# Patient Record
Sex: MALE | Race: WHITE | NOT HISPANIC OR LATINO
[De-identification: names, ages, dates, MRNs, and addresses within clinical notes are randomized per-mention and may not be internally consistent; named-entity substitution may affect disease eponyms.]

---

## 2022-12-27 ENCOUNTER — APPOINTMENT (OUTPATIENT)
Dept: UROLOGY | Facility: CLINIC | Age: 37
End: 2022-12-27

## 2022-12-27 VITALS
SYSTOLIC BLOOD PRESSURE: 132 MMHG | WEIGHT: 160 LBS | BODY MASS INDEX: 22.9 KG/M2 | OXYGEN SATURATION: 99 % | HEART RATE: 88 BPM | DIASTOLIC BLOOD PRESSURE: 78 MMHG | HEIGHT: 70 IN

## 2022-12-27 DIAGNOSIS — Z78.9 OTHER SPECIFIED HEALTH STATUS: ICD-10-CM

## 2022-12-27 DIAGNOSIS — N40.1 BENIGN PROSTATIC HYPERPLASIA WITH LOWER URINARY TRACT SYMPMS: ICD-10-CM

## 2022-12-27 DIAGNOSIS — N13.8 BENIGN PROSTATIC HYPERPLASIA WITH LOWER URINARY TRACT SYMPMS: ICD-10-CM

## 2022-12-27 PROBLEM — Z00.00 ENCOUNTER FOR PREVENTIVE HEALTH EXAMINATION: Status: ACTIVE | Noted: 2022-12-27

## 2022-12-27 PROCEDURE — 99204 OFFICE O/P NEW MOD 45 MIN: CPT

## 2022-12-27 RX ORDER — MELOXICAM 15 MG/1
15 TABLET ORAL
Qty: 30 | Refills: 0 | Status: ACTIVE | COMMUNITY
Start: 2022-07-21

## 2022-12-27 RX ORDER — ALFUZOSIN HYDROCHLORIDE 10 MG/1
10 TABLET, EXTENDED RELEASE ORAL DAILY
Qty: 30 | Refills: 1 | Status: ACTIVE | COMMUNITY
Start: 2022-12-27 | End: 1900-01-01

## 2022-12-27 RX ORDER — SULFAMETHOXAZOLE AND TRIMETHOPRIM 800; 160 MG/1; MG/1
800-160 TABLET ORAL
Qty: 28 | Refills: 0 | Status: ACTIVE | COMMUNITY
Start: 2022-12-13

## 2022-12-27 RX ORDER — CIPROFLOXACIN HYDROCHLORIDE 500 MG/1
500 TABLET, FILM COATED ORAL
Qty: 20 | Refills: 0 | Status: ACTIVE | COMMUNITY
Start: 2022-12-12

## 2023-02-10 ENCOUNTER — APPOINTMENT (OUTPATIENT)
Dept: UROLOGY | Facility: CLINIC | Age: 38
End: 2023-02-10
Payer: COMMERCIAL

## 2023-02-10 VITALS
HEIGHT: 70 IN | HEART RATE: 92 BPM | OXYGEN SATURATION: 98 % | WEIGHT: 155 LBS | BODY MASS INDEX: 22.19 KG/M2 | SYSTOLIC BLOOD PRESSURE: 125 MMHG | TEMPERATURE: 98.2 F | DIASTOLIC BLOOD PRESSURE: 73 MMHG

## 2023-02-10 PROCEDURE — 52000 CYSTOURETHROSCOPY: CPT

## 2023-02-10 NOTE — ASSESSMENT
[FreeTextEntry1] : pvr around 400 ml \par suspect chronic - nl cr\par reviewed cath - defers - completing his training at [employer] next few weeks \par when he returns will do ct and cysto \par start alfuzosin - side effects reviewed

## 2023-02-10 NOTE — REVIEW OF SYSTEMS
[both] : pain during and after intercourse [denies] : denies pain with orgasm [base] : pain in base of penis [Blood in urine that you can see] : blood visible in urine [Wake up at night to urinate  How many times?  ___] : wakes up to urinate [unfilled] times during the night [Strong urge to urinate] : strong urge to urinate [Strain or push to urinate] : strain or push to urinate [Slow urine stream] : slow urine stream [Bladder fullness after urinating] : bladder fullness after urinating [Negative] : Heme/Lymph [FreeTextEntry2] : frequent urination

## 2023-02-10 NOTE — HISTORY OF PRESENT ILLNESS
[FreeTextEntry1] : cc blood in urine \par 38 yo male c/o blood in urine \par had several episode of terminal gross hematuria a few weeks ago \par no dysuria \par reports obstructive urinary symptoms for last year . slow flow dribbling splitting \par had prior cath many years ago when he went into retention after tonsil surgery - reports it was traumatic requiring urology consult \par sexually active wife only no h/o std \par no constipation \par went to [facility] labs /cr\par taking course of bactrtim feeling a little better\par no smoking \par no fam h/ gu ca \par fbi agent

## 2023-02-16 ENCOUNTER — APPOINTMENT (OUTPATIENT)
Dept: UROLOGY | Facility: CLINIC | Age: 38
End: 2023-02-16
Payer: COMMERCIAL

## 2023-02-16 VITALS
RESPIRATION RATE: 18 BRPM | HEART RATE: 78 BPM | WEIGHT: 152.1 LBS | SYSTOLIC BLOOD PRESSURE: 127 MMHG | BODY MASS INDEX: 21.82 KG/M2 | OXYGEN SATURATION: 98 % | TEMPERATURE: 97.8 F | DIASTOLIC BLOOD PRESSURE: 84 MMHG

## 2023-02-16 DIAGNOSIS — R31.0 GROSS HEMATURIA: ICD-10-CM

## 2023-02-16 DIAGNOSIS — Z82.0 FAMILY HISTORY OF EPILEPSY AND OTHER DISEASES OF THE NERVOUS SYSTEM: ICD-10-CM

## 2023-02-16 PROCEDURE — 99214 OFFICE O/P EST MOD 30 MIN: CPT

## 2023-02-16 NOTE — PHYSICAL EXAM
[General Appearance - Well Developed] : well developed [General Appearance - Well Nourished] : well nourished [Normal Appearance] : normal appearance [Well Groomed] : well groomed [General Appearance - In No Acute Distress] : no acute distress [Urethral Meatus] : meatus normal [Urinary Bladder Findings] : the bladder was normal on palpation [Scrotum] : the scrotum was normal [Testes Tenderness] : no tenderness of the testes [Testes Mass (___cm)] : there were no testicular masses

## 2023-02-16 NOTE — ASSESSMENT
[FreeTextEntry1] : Patient is a 36 yo M who presents for gross hematuria, urethral stricture, and LUTS.\par \par -hematuria likely related to urethral stricture seen on cysto with Dr Coley, will check Ucyto for complete w/u.\par -PVR today is low, however pt reports restricted fluid today, and voided 5x already\par -d/w pt that will perform RUG to further evaluate stricture.  I had a discussion with the pt about his condition and possible treatment options for his urethral stricture.  We discussed endoscopic management such as internal urethrotomy and urethroplasty.  Discussed the typical reported success rates with each treatment option.

## 2023-02-16 NOTE — HISTORY OF PRESENT ILLNESS
[FreeTextEntry1] : Patient is 37 year old M who has no significant medical history presents for urethral stricture evaluation.\par Patient reports about ~3 year history of weak/slow stream, urinary frequency, urgency, straining, takes longer time to complete, urine split, incomplete empty sensation, symptom gradually worsening for 8-9 months. He reports urinary frequency every 30 minutes during the daytime. Patient also noted terminal gross hematuria in Dec, 2022. \par \par No incontinence, no history of perineal trauma. Patient reports he had difficulty to urinate after tonsillectomy in 2009, need urological consultation for catheter placement  to drain urine.  Reports multiple traumatic attempts prior to urologist who performed catheter placement - ?dilation.\par \par He has reduced his fluid intake to 1 glass/bottle per day.  \par He works for [employer] and states he is often in a car for long periods of time and therefore cannot go to the bathroom.\par Patient denied history of STIs. \par \par Patient reports never smoking, drinks socially, no substance use. \par \par \par

## 2023-02-17 ENCOUNTER — APPOINTMENT (OUTPATIENT)
Age: 38
End: 2023-02-17

## 2023-02-17 LAB — URINE CYTOLOGY: NORMAL

## 2023-02-24 ENCOUNTER — OUTPATIENT (OUTPATIENT)
Dept: OUTPATIENT SERVICES | Facility: HOSPITAL | Age: 38
LOS: 1 days | End: 2023-02-24
Payer: COMMERCIAL

## 2023-02-24 ENCOUNTER — APPOINTMENT (OUTPATIENT)
Dept: UROLOGY | Facility: CLINIC | Age: 38
End: 2023-02-24
Payer: COMMERCIAL

## 2023-02-24 VITALS
TEMPERATURE: 98 F | WEIGHT: 155 LBS | OXYGEN SATURATION: 100 % | HEIGHT: 70 IN | DIASTOLIC BLOOD PRESSURE: 73 MMHG | BODY MASS INDEX: 22.19 KG/M2 | SYSTOLIC BLOOD PRESSURE: 130 MMHG | HEART RATE: 70 BPM

## 2023-02-24 DIAGNOSIS — R35.0 FREQUENCY OF MICTURITION: ICD-10-CM

## 2023-02-24 PROCEDURE — 51610 INJECTION FOR BLADDER X-RAY: CPT

## 2023-02-24 PROCEDURE — 74450 X-RAY URETHRA/BLADDER: CPT

## 2023-02-24 PROCEDURE — 74450 X-RAY URETHRA/BLADDER: CPT | Mod: 26

## 2023-02-27 DIAGNOSIS — N35.919 UNSPECIFIED URETHRAL STRICTURE, MALE, UNSPECIFIED SITE: ICD-10-CM

## 2023-04-05 ENCOUNTER — OUTPATIENT (OUTPATIENT)
Dept: OUTPATIENT SERVICES | Facility: HOSPITAL | Age: 38
LOS: 1 days | End: 2023-04-05
Payer: COMMERCIAL

## 2023-04-05 VITALS
WEIGHT: 153 LBS | TEMPERATURE: 98 F | OXYGEN SATURATION: 97 % | SYSTOLIC BLOOD PRESSURE: 108 MMHG | RESPIRATION RATE: 18 BRPM | DIASTOLIC BLOOD PRESSURE: 79 MMHG | HEIGHT: 71 IN | HEART RATE: 79 BPM

## 2023-04-05 DIAGNOSIS — N35.919 UNSPECIFIED URETHRAL STRICTURE, MALE, UNSPECIFIED SITE: ICD-10-CM

## 2023-04-05 DIAGNOSIS — Z90.89 ACQUIRED ABSENCE OF OTHER ORGANS: Chronic | ICD-10-CM

## 2023-04-05 PROCEDURE — 87086 URINE CULTURE/COLONY COUNT: CPT

## 2023-04-05 PROCEDURE — G0463: CPT

## 2023-04-05 PROCEDURE — 36415 COLL VENOUS BLD VENIPUNCTURE: CPT

## 2023-04-05 PROCEDURE — 85027 COMPLETE CBC AUTOMATED: CPT

## 2023-04-05 RX ORDER — SODIUM CHLORIDE 9 MG/ML
1000 INJECTION, SOLUTION INTRAVENOUS
Refills: 0 | Status: DISCONTINUED | OUTPATIENT
Start: 2023-04-26 | End: 2023-05-10

## 2023-04-25 ENCOUNTER — TRANSCRIPTION ENCOUNTER (OUTPATIENT)
Age: 38
End: 2023-04-25

## 2023-04-25 PROBLEM — Z78.9 OTHER SPECIFIED HEALTH STATUS: Chronic | Status: ACTIVE | Noted: 2023-04-05

## 2023-04-26 ENCOUNTER — APPOINTMENT (OUTPATIENT)
Dept: UROLOGY | Facility: HOSPITAL | Age: 38
End: 2023-04-26

## 2023-04-26 ENCOUNTER — OUTPATIENT (OUTPATIENT)
Dept: OUTPATIENT SERVICES | Facility: HOSPITAL | Age: 38
LOS: 1 days | End: 2023-04-26
Payer: COMMERCIAL

## 2023-04-26 ENCOUNTER — TRANSCRIPTION ENCOUNTER (OUTPATIENT)
Age: 38
End: 2023-04-26

## 2023-04-26 ENCOUNTER — RESULT REVIEW (OUTPATIENT)
Age: 38
End: 2023-04-26

## 2023-04-26 VITALS
WEIGHT: 153 LBS | OXYGEN SATURATION: 97 % | HEART RATE: 64 BPM | SYSTOLIC BLOOD PRESSURE: 118 MMHG | DIASTOLIC BLOOD PRESSURE: 67 MMHG | TEMPERATURE: 98 F | HEIGHT: 71 IN | RESPIRATION RATE: 16 BRPM

## 2023-04-26 VITALS
DIASTOLIC BLOOD PRESSURE: 42 MMHG | RESPIRATION RATE: 16 BRPM | OXYGEN SATURATION: 98 % | HEART RATE: 60 BPM | SYSTOLIC BLOOD PRESSURE: 113 MMHG

## 2023-04-26 DIAGNOSIS — Z90.89 ACQUIRED ABSENCE OF OTHER ORGANS: Chronic | ICD-10-CM

## 2023-04-26 DIAGNOSIS — N35.919 UNSPECIFIED URETHRAL STRICTURE, MALE, UNSPECIFIED SITE: ICD-10-CM

## 2023-04-26 PROCEDURE — C1769: CPT

## 2023-04-26 PROCEDURE — 88305 TISSUE EXAM BY PATHOLOGIST: CPT | Mod: 26

## 2023-04-26 PROCEDURE — 53410 RECONSTRUCTION OF URETHRA: CPT

## 2023-04-26 PROCEDURE — 88305 TISSUE EXAM BY PATHOLOGIST: CPT

## 2023-04-26 PROCEDURE — C1758: CPT

## 2023-04-26 PROCEDURE — C9399: CPT

## 2023-04-26 DEVICE — URETERAL CATH OPEN END 5FR 70CM: Type: IMPLANTABLE DEVICE | Status: FUNCTIONAL

## 2023-04-26 DEVICE — GUIDEWIRE SENSOR DUAL-FLEX NITINOL STRAIGHT .038" X 150CM: Type: IMPLANTABLE DEVICE | Status: FUNCTIONAL

## 2023-04-26 RX ORDER — ACETAMINOPHEN WITH CODEINE 300MG-30MG
1 TABLET ORAL
Qty: 15 | Refills: 0
Start: 2023-04-26 | End: 2023-04-30

## 2023-04-26 RX ORDER — CEPHALEXIN 500 MG
1 CAPSULE ORAL
Qty: 15 | Refills: 0
Start: 2023-04-26 | End: 2023-04-30

## 2023-04-26 RX ORDER — CEPHALEXIN 500 MG
1 CAPSULE ORAL
Qty: 9 | Refills: 0
Start: 2023-04-26 | End: 2023-04-28

## 2023-04-26 RX ORDER — CEFAZOLIN SODIUM 1 G
2000 VIAL (EA) INJECTION ONCE
Refills: 0 | Status: COMPLETED | OUTPATIENT
Start: 2023-04-26 | End: 2023-04-26

## 2023-04-26 RX ORDER — IBUPROFEN 200 MG
1 TABLET ORAL
Qty: 20 | Refills: 0
Start: 2023-04-26 | End: 2023-04-30

## 2023-04-26 RX ORDER — ONDANSETRON 8 MG/1
4 TABLET, FILM COATED ORAL ONCE
Refills: 0 | Status: DISCONTINUED | OUTPATIENT
Start: 2023-04-26 | End: 2023-05-10

## 2023-04-26 RX ORDER — LIDOCAINE HCL 20 MG/ML
0.2 VIAL (ML) INJECTION ONCE
Refills: 0 | Status: COMPLETED | OUTPATIENT
Start: 2023-04-26 | End: 2023-04-26

## 2023-04-26 RX ORDER — FENTANYL CITRATE 50 UG/ML
25 INJECTION INTRAVENOUS
Refills: 0 | Status: DISCONTINUED | OUTPATIENT
Start: 2023-04-26 | End: 2023-04-26

## 2023-04-26 RX ADMIN — SODIUM CHLORIDE 100 MILLILITER(S): 9 INJECTION, SOLUTION INTRAVENOUS at 06:19

## 2023-04-26 NOTE — ASU DISCHARGE PLAN (ADULT/PEDIATRIC) - NURSING INSTRUCTIONS
You can take Tylenol (Acetaminophen) every 6 hours and Motrin (Ibuprofen) every 6 hours, as needed. You may alternate these medications such that you take one or the other every 3 hours for around the clock pain coverage. Do not exceed 4000mg of Tylenol (Acetaminophen) daily. You received Tylenol in the OR at 7:30AM. The next time you can take Tylenol is at 1:30PM. You can take Motrin/Ibuprofen at anytime today, if needed.

## 2023-04-26 NOTE — ASU DISCHARGE PLAN (ADULT/PEDIATRIC) - ASU DC SPECIAL INSTRUCTIONSFT
URETHRAL STRICTURE – URETHROPLASTY    Description of Surgery  Urethroplasty is a surgery performed to correct a urethral stricture. A urethral stricture refers to a blockage or narrowing of the urethra, the urine “tube” or part of the urinary tract that releases urine from the bladder and semen during ejaculation.  During surgery, an incision is typically made in the perineum just below the scrotum and the blocked area is removed and repaired.  Sometimes an incision will be made in the penis if the stricture is in the penile urethra.  Following repair a urinary catheter is left in place for assist in healing.    Postoperative Care  • After surgery, you may be discharged home the same day or stay in the hospital overnight.  There will be a catheter left in place which will be connected to a drainage bag (either a leg bag or a larger overnight bag).  You will be taught how to change the bag and empty it when it is full of urine.  The catheter will remain in place for roughly 2-3 weeks and will be removed at your follow up.  You may notice some blood or red/pink tinge to the urine with the catheter in place, this is normal during healing.  • Immediately following surgery, the perineal incision is closed with absorbable sutures underneath the skin followed by biological, waterproof glue on the skin.  The sutures will slowly dissolve over time and do not need to be removed.   The bluish colored glue will also slowly peel away on its own over a period of a few weeks.  In some instances, additional dressings may be placed such as gauze and a clear plastic sheet.  If a gauze dressing is placed, this may be removed after 3 days.  • You may experience mild perineal/scrotal itching.  This is normal and is the result of tissue healing.  You can also expect bruising and swelling of the perineum and scrotum following surgery, this can last for days to weeks.  Ice packs can be applied for 24 hours after surgery to reduce pain and swelling if necessary.  The amount of swelling is variable.  Scrotal support can also help reduce swelling and is best applied continuously during the initial few days after surgery.  • Pain in this area will vary.  You will be given pain medication to go home with.  Generally, most patients do not require strong pain medication for more than a couple of weeks.  Oftentimes, patients are able to transition themselves to over-the-counter pain medication alone, such as Extra-strength Tylenol or Ibuprofen at that time.     Diet  • There are no specific dietary restrictions unless buccal mucosa was harvested.  You may resume your usual diet immediately after surgery.  You should avoid alcohol while taking pain medications.  Do drink plenty of fluids to keep yourself well hydrated and your urine clear.    Bathing  • Avoid bathing, showering, or soaking for two days after surgery.  After 2 days, it is ok to shower but take care to avoid scrubbing or rubbing the incision.  It is best to allow soapy water to run over the surgical incision and to pat it dry.  • If you must bathe prior to the second day, it is best to sponge bathe and avoid wetting the incision.  Soaking in a tub should be avoided for one week.    Discharge Medications  • You will be discharged home with a prescription for pain medication and an antibiotic for roughly one week.  • In addition, prior to/around your catheter removal, you may be given further antibiotics.    Activities after surgery  • You may resume normal activities such as walking, lifting (no more than 10 pounds) and walking up stairs.  • You should refrain from high impact exercise (running, jumping, aerobics) and heavy lifting for 3-4 weeks following surgery, depending on your doctor’s instructions.  • Do not drive a car while you are taking narcotic pain medication. Before you drive, you need to know if your abdomen and leg muscles can react well. Have someone with you until you feel you have healed and you can drive safely.  • In general use your common sense and do what you feel up to doing. If you feel tired, take it easy. If you feel more energetic resume normal activities.    When to call your doctor  • If you experience a temperature above 101°F or shaking chills.  • Difficulty urinating or feeling like you cannot adequately empty your bladder once your catheter has been removed.  Some mild irritation or urinary changes is normal during the healing process, however significant pain with urination or inability to urine is unusual.  • Swelling, redness or discharge of the incision sites.  • Pain that is not controlled by pain medications.  • Persistent nausea, vomiting or diarrhea.    Postoperative appointment  • In general, your first post-operative appointment will take place approximately 2-3 weeks after surgery.  Your catheter may or may not be removed at this time.

## 2023-04-26 NOTE — ASU PATIENT PROFILE, ADULT - NS TRANSFER PATIENT BELONGINGS
wallet, $100, credit card, insurance card, photo id/Cell Phone/PDA (specify)/Jewelry/Money (specify)/Clothing

## 2023-04-26 NOTE — ASU PATIENT PROFILE, ADULT - FALL HARM RISK - UNIVERSAL INTERVENTIONS
Bed in lowest position, wheels locked, appropriate side rails in place/Call bell, personal items and telephone in reach/Instruct patient to call for assistance before getting out of bed or chair/Non-slip footwear when patient is out of bed/[place] to call system/Physically safe environment - no spills, clutter or unnecessary equipment/Purposeful Proactive Rounding/Room/bathroom lighting operational, light cord in reach

## 2023-04-26 NOTE — BRIEF OPERATIVE NOTE - OPERATION/FINDINGS
Procedure: urethroplasty  Preop dx: bulbar urethral stricture   Postop dx: bulbar urethral stricture

## 2023-04-26 NOTE — ASU DISCHARGE PLAN (ADULT/PEDIATRIC) - CARE PROVIDER_API CALL
Fan Humphrey)  Urology  [address] [place] [address], [place] [address]  Phone: [phone]  Fax: [phone]  Follow Up Time: 2 weeks

## 2023-04-26 NOTE — ASU DISCHARGE PLAN (ADULT/PEDIATRIC) - A. DRIVE A CAR, OPERATE POWER TOOLS OR MACHINERY
----- Message from Domingo Crisostomo M.D. sent at 8/5/2019  2:07 PM PDT -----  Please notify patient that her colon test (cologuard) is negative for cancer.  We recommend having this test done every 3 years for cancer screening.    Domingo Crisostomo M.D.     Statement Selected

## 2023-05-02 ENCOUNTER — APPOINTMENT (OUTPATIENT)
Age: 38
End: 2023-05-02

## 2023-05-02 LAB — SURGICAL PATHOLOGY STUDY: SIGNIFICANT CHANGE UP

## 2023-05-12 ENCOUNTER — OUTPATIENT (OUTPATIENT)
Dept: OUTPATIENT SERVICES | Facility: HOSPITAL | Age: 38
LOS: 1 days | End: 2023-05-12
Payer: COMMERCIAL

## 2023-05-12 ENCOUNTER — APPOINTMENT (OUTPATIENT)
Dept: UROLOGY | Facility: CLINIC | Age: 38
End: 2023-05-12
Payer: COMMERCIAL

## 2023-05-12 VITALS
RESPIRATION RATE: 18 BRPM | SYSTOLIC BLOOD PRESSURE: 125 MMHG | TEMPERATURE: 98.4 F | HEART RATE: 97 BPM | DIASTOLIC BLOOD PRESSURE: 74 MMHG | OXYGEN SATURATION: 96 %

## 2023-05-12 DIAGNOSIS — R35.0 FREQUENCY OF MICTURITION: ICD-10-CM

## 2023-05-12 DIAGNOSIS — Z90.89 ACQUIRED ABSENCE OF OTHER ORGANS: Chronic | ICD-10-CM

## 2023-05-12 PROCEDURE — 74450 X-RAY URETHRA/BLADDER: CPT | Mod: 26

## 2023-05-12 PROCEDURE — 51610 INJECTION FOR BLADDER X-RAY: CPT | Mod: 58

## 2023-05-12 PROCEDURE — 51610 INJECTION FOR BLADDER X-RAY: CPT

## 2023-05-12 PROCEDURE — 74450 X-RAY URETHRA/BLADDER: CPT

## 2023-05-15 DIAGNOSIS — N35.919 UNSPECIFIED URETHRAL STRICTURE, MALE, UNSPECIFIED SITE: ICD-10-CM

## 2023-06-09 ENCOUNTER — APPOINTMENT (OUTPATIENT)
Dept: UROLOGY | Facility: CLINIC | Age: 38
End: 2023-06-09

## 2023-06-09 ENCOUNTER — APPOINTMENT (OUTPATIENT)
Dept: UROLOGY | Facility: CLINIC | Age: 38
End: 2023-06-09
Payer: COMMERCIAL

## 2023-06-09 ENCOUNTER — APPOINTMENT (OUTPATIENT)
Age: 38
End: 2023-06-09

## 2023-06-09 VITALS
DIASTOLIC BLOOD PRESSURE: 82 MMHG | HEART RATE: 65 BPM | RESPIRATION RATE: 17 BRPM | BODY MASS INDEX: 21 KG/M2 | HEIGHT: 71 IN | SYSTOLIC BLOOD PRESSURE: 129 MMHG | WEIGHT: 150 LBS

## 2023-06-09 PROCEDURE — 51798 US URINE CAPACITY MEASURE: CPT

## 2023-06-09 PROCEDURE — 51741 ELECTRO-UROFLOWMETRY FIRST: CPT

## 2023-06-09 PROCEDURE — 99024 POSTOP FOLLOW-UP VISIT: CPT

## 2023-06-09 NOTE — ASSESSMENT
[FreeTextEntry1] : Patient is a 39 yo M who presents for urethral stricture s/p urethroplasty.\par \par -UF/PVR\par -Voiding well, some change in erections.  He feels his QOL has improved significantly at work.  He can focus at work better\par -Some decline in erections - will observe\par F/u 3 mos for UF/PVR

## 2023-06-09 NOTE — HISTORY OF PRESENT ILLNESS
[FreeTextEntry1] : Patient is 38 year old M who has no significant medical history presents for urethral stricture.\par \par HPI:\par Patient reports about ~3 year history of weak/slow stream, urinary frequency, urgency, straining, takes longer time to complete, urine split, incomplete empty sensation, symptom gradually worsening for 8-9 months. He reports urinate frequency every 30 minutes during the daytime. Patient also noted terminal gross hematuria in Dec, 2022. \par \par No incontinence, no history of perineal trauma. Patient reports he had difficulty to urinate after tonsillectomy in 2009, need urological consultation for catheter placement  to drain urine.  Reports multiple traumatic attempts prior to urologist who performed catheter placement - ?dilation.\par \par He has reduced his fluid intake to 1 glass/bottle per day.  \par He works for [employer] and states he is often in a car for long periods of time and therefore cannot go to the bathroom.\par Patient denied history of STIs. \par \par Interval hx:\par S/p cystoscopy nontransecting anastomotic urethroplasty in 4/26/23.  Here for f/u.\par He feels voiding is excellent.  Denies dysuria, hematuria, straining, weak stream.\par He does report "pulling sensation" and slight discomfort.  He feels it is distracting and he is not as erect (only 8/10 rigidity) as previously.  He has engaged in sex and ejaculated ~5-10x.  Ejaculation is better/more forceful.

## 2023-09-15 ENCOUNTER — APPOINTMENT (OUTPATIENT)
Dept: UROLOGY | Facility: CLINIC | Age: 38
End: 2023-09-15
Payer: COMMERCIAL

## 2023-09-15 ENCOUNTER — APPOINTMENT (OUTPATIENT)
Age: 38
End: 2023-09-15

## 2023-09-15 VITALS
BODY MASS INDEX: 22.9 KG/M2 | HEIGHT: 70 IN | RESPIRATION RATE: 17 BRPM | DIASTOLIC BLOOD PRESSURE: 85 MMHG | HEART RATE: 75 BPM | WEIGHT: 160 LBS | SYSTOLIC BLOOD PRESSURE: 135 MMHG

## 2023-09-15 PROCEDURE — 99213 OFFICE O/P EST LOW 20 MIN: CPT

## 2023-09-15 PROCEDURE — 51798 US URINE CAPACITY MEASURE: CPT

## 2023-09-15 PROCEDURE — 51741 ELECTRO-UROFLOWMETRY FIRST: CPT

## 2024-01-01 NOTE — ASU PREOP CHECKLIST - STERILIZATION AFFIRMATION
N/A Patient is under age 18 and does not have a history of high risk behavior or is not high risk for Hep C
n/a

## 2024-01-29 ENCOUNTER — APPOINTMENT (OUTPATIENT)
Dept: UROLOGY | Facility: CLINIC | Age: 39
End: 2024-01-29
Payer: COMMERCIAL

## 2024-01-29 VITALS
BODY MASS INDEX: 22.9 KG/M2 | HEIGHT: 70 IN | TEMPERATURE: 97.6 F | OXYGEN SATURATION: 100 % | SYSTOLIC BLOOD PRESSURE: 122 MMHG | HEART RATE: 75 BPM | DIASTOLIC BLOOD PRESSURE: 76 MMHG | RESPIRATION RATE: 17 BRPM | WEIGHT: 160 LBS

## 2024-01-29 DIAGNOSIS — N35.919 UNSPECIFIED URETHRAL STRICTURE, MALE, UNSPECIFIED SITE: ICD-10-CM

## 2024-01-29 PROCEDURE — 99213 OFFICE O/P EST LOW 20 MIN: CPT

## 2024-01-29 PROCEDURE — 51798 US URINE CAPACITY MEASURE: CPT

## 2024-01-29 NOTE — PHYSICAL EXAM
[General Appearance - Well Developed] : well developed [General Appearance - Well Nourished] : well nourished [Normal Appearance] : normal appearance [Well Groomed] : well groomed [General Appearance - In No Acute Distress] : no acute distress [] : no respiratory distress [Respiration, Rhythm And Depth] : normal respiratory rhythm and effort [Exaggerated Use Of Accessory Muscles For Inspiration] : no accessory muscle use [Urinary Bladder Findings] : the bladder was normal on palpation

## 2024-01-29 NOTE — HISTORY OF PRESENT ILLNESS
[FreeTextEntry1] : Patient is 38 year old M who has no significant medical history presents for urethral stricture.  HPI: Patient reports about ~3 year history of weak/slow stream, urinary frequency, urgency, straining, takes longer time to complete, urine split, incomplete empty sensation, symptom gradually worsening for 8-9 months. He reports urinate frequency every 30 minutes during the daytime. Patient also noted terminal gross hematuria in Dec, 2022. No incontinence, no history of perineal trauma. Patient reports he had difficulty to urinate after tonsillectomy in 2009, need urological consultation for catheter placement to drain urine. Reports multiple traumatic attempts prior to urologist who performed catheter placement - ?dilation. He has reduced his fluid intake to 1 glass/bottle per day. He works for [employer] and states he is often in a car for long periods of time and therefore cannot go to the bathroom. Patient denied history of STIs.  Interval hx: S/p cystoscopy nontransecting anastomotic urethroplasty in 4/26/23.  1/28/24 He returns for follow up today. Reports FOS improved 90% compared to preop.  This is stable since surgery.  He is very satisfied with the surgery.  FOS strong, nocturia x 0. No straining. No gross hematuria, dysuria. He notes good erections, slight improvement in ejaculation.  He feels that there may a slight "degradation" in erections, but very minimal and not noticeable overall.  He has no need or interested in ED meds.

## 2024-01-29 NOTE — ASSESSMENT
[FreeTextEntry1] : Patient is 38 year old M who has no significant medical history presents for urethral stricture.  S/p urethroplasty. PVR  F/u 4 mos

## (undated) DEVICE — ELCTR BOVIE TIP NEEDLE INSULATED 2.8" EDGE

## (undated) DEVICE — SUT CAPIO 0 48" TC DA

## (undated) DEVICE — GLV 7.5 PROTEXIS (WHITE)

## (undated) DEVICE — PREP BETADINE SPONGE STICKS

## (undated) DEVICE — DRSG TAPE TRANSPORE 1"

## (undated) DEVICE — PREP CHLORAPREP HI-LITE ORANGE 26ML

## (undated) DEVICE — SOL IRR POUR H2O 500ML

## (undated) DEVICE — PACK GENERAL MINOR

## (undated) DEVICE — SPONGE DISSECTOR PEANUT

## (undated) DEVICE — SUT CHROMIC 2-0 30" V-20

## (undated) DEVICE — SUT CHROMIC 4-0 30" C-13

## (undated) DEVICE — CANISTER DISPOSABLE THIN WALL 3000CC

## (undated) DEVICE — Device

## (undated) DEVICE — LONE STAR RETRACTOR RING 32.5CM X 18.3CM DISP

## (undated) DEVICE — VAGINAL PACKING 2"

## (undated) DEVICE — MARKING PEN W RULER

## (undated) DEVICE — ACMI SELF-SEALING SEAL UP TO 7FR

## (undated) DEVICE — LABELS BLANK W PEN

## (undated) DEVICE — SUCTION YANKAUER TAPERED BULBOUS NO VENT

## (undated) DEVICE — POSITIONER PATIENT SAFETY STRAP 3X60"

## (undated) DEVICE — SOL IRR POUR NS 0.9% 500ML

## (undated) DEVICE — LONE STAR ELASTIC STAY HOOK 12MM BLUNT

## (undated) DEVICE — DRAIN JACKSON PRATT 3 SPRING RESERVOIR W 10FR PVC DRAIN

## (undated) DEVICE — SUT PLAIN GUT 2-0 30" V-20

## (undated) DEVICE — DRAPE TOWEL BLUE 17" X 24"

## (undated) DEVICE — SUT PDS II 5-0 27" RB-1

## (undated) DEVICE — VENODYNE/SCD SLEEVE CALF MEDIUM

## (undated) DEVICE — DRAINAGE BAG URINARY 2L

## (undated) DEVICE — DRAPE 3/4 SHEET W REINFORCEMENT 56X77"

## (undated) DEVICE — DRAPE SPLIT SHEET 77" X 108"

## (undated) DEVICE — DRAIN PENROSE .5" X 18" LATEX

## (undated) DEVICE — STAPLER SKIN VISI-STAT 35 WIDE

## (undated) DEVICE — SUT MONOCRYL 3-0 18" PS-2 UNDYED

## (undated) DEVICE — SUT CHROMIC 3-0 30" V-20

## (undated) DEVICE — FOLEY CATH 2-WAY 16FR 5CC LATEX COUNCIL RED

## (undated) DEVICE — DRSG CURITY GAUZE SPONGE 4 X 4" 12-PLY

## (undated) DEVICE — SUT POLYSORB 3-0 30" V-20 UNDYED

## (undated) DEVICE — PREP BETADINE KIT

## (undated) DEVICE — DRSG BENZOIN 0.6CC

## (undated) DEVICE — NDL HYPO REGULAR BEVEL 25G X 1.5" (BLUE)

## (undated) DEVICE — TUBING IRR SET FOR CYSTOSCOPY 77"

## (undated) DEVICE — LONE STAR ELASTIC STAY HOOK 20MM 3 FINGER RAKE

## (undated) DEVICE — SUT SOFSILK 2-0 18" C-23

## (undated) DEVICE — POSITIONER STRAP ARMBOARD VELCRO TS-30

## (undated) DEVICE — FRAZIER SUCTION TIP 8FR

## (undated) DEVICE — ELCTR BOVIE PENCIL BLADE 10FT

## (undated) DEVICE — TUBING SUCTION 20FT

## (undated) DEVICE — WARMING BLANKET FULL ADULT

## (undated) DEVICE — BLADE SURGICAL #15 CARBON

## (undated) DEVICE — TUBING SUCTION NONCONDUCTIVE 6MM X 12FT

## (undated) DEVICE — DRAPE MAYO STAND 23"

## (undated) DEVICE — FOLEY CATH 2-WAY 18FR 5CC LATEX COUNCIL RED

## (undated) DEVICE — SUT VICRYL 5-0 27" RB-1 UNDYED

## (undated) DEVICE — FOLEY TRAY 16FR LF URINE METER SURESTEP

## (undated) DEVICE — ELCTR GROUNDING PAD ADULT COVIDIEN

## (undated) DEVICE — DRSG DERMABOND 0.7ML

## (undated) DEVICE — PROTECTOR HEEL CONVOLUTED

## (undated) DEVICE — NDL COUNTER FOAM AND MAGNET 20-40

## (undated) DEVICE — SUT CAPIO SLIM CAPTURING DEVICE

## (undated) DEVICE — SUT POLYSORB 4-0 30" CVF-23 UNDYED